# Patient Record
Sex: FEMALE | Race: WHITE
[De-identification: names, ages, dates, MRNs, and addresses within clinical notes are randomized per-mention and may not be internally consistent; named-entity substitution may affect disease eponyms.]

---

## 2019-01-08 ENCOUNTER — HOSPITAL ENCOUNTER (EMERGENCY)
Dept: HOSPITAL 31 - C.ER | Age: 24
Discharge: HOME | End: 2019-01-08
Payer: COMMERCIAL

## 2019-01-08 VITALS
RESPIRATION RATE: 16 BRPM | DIASTOLIC BLOOD PRESSURE: 74 MMHG | HEART RATE: 81 BPM | TEMPERATURE: 98.4 F | SYSTOLIC BLOOD PRESSURE: 119 MMHG

## 2019-01-08 VITALS — OXYGEN SATURATION: 98 %

## 2019-01-08 DIAGNOSIS — S63.611A: Primary | ICD-10-CM
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
## 2019-01-08 NOTE — RAD
Date of service: 



01/08/2019



PROCEDURE:  Left Index finger radiographs.



HISTORY:

pain and swelling after lifting a heavy carseat



COMPARISON:

None.



TECHNIQUE:

AP radiograph of the left hand, as well as spot oblique and lateral 

images of index finger were obtained.



FINDINGS:



LEFT INDEX FINGER:

Normal left index finger, without fracture or focal lesion. Remainder 

of the left hand (as seen on the AP view) grossly intact.



JOINTS:

Normal. 



SOFT TISSUES:

Normal. 



OTHER FINDINGS:

None.



IMPRESSION:

Normal left index finger radiographs.

## 2019-01-08 NOTE — C.PDOC
History Of Present Illness


23 year old female presents to the ED c/o left index finger pain that started 

few hours PTA. Patient states she lifted a baby car seat but does not recall 

injuring her finger. Patient did not feel pain immediately but few minutes later

she noticed swelling and more pain to the area. Patient denies fever, chills, 

rash, weakness, numbness, direct trauma, injury, fall. 


Time Seen by Provider: 01/08/19 01:23


Chief Complaint (Nursing): Upper Extremity Problem/Injury


History Per: Patient


History/Exam Limitations: no limitations


Onset/Duration Of Symptoms: Hrs


Current Symptoms Are (Timing): Still Present


Quality: "Pain"


Recent travel outside of the [place]: No


Additional History Per: Patient





Past Medical History


Reviewed: Historical Data, Nursing Documentation, Vital Signs


Vital Signs: 





                                Last Vital Signs











Temp  98.6 F   01/08/19 01:15


 


Pulse  100 H  01/08/19 01:15


 


Resp  20   01/08/19 01:15


 


BP  112/77   01/08/19 01:15


 


Pulse Ox  98   01/08/19 01:15














- Medical History


PMH: No Chronic Diseases


Surgical History: No Surg Hx


Family History: States: Unknown Family Hx





- Social History


Hx Alcohol Use: No


Hx Substance Use: No





Review Of Systems


Constitutional: Negative for: Fever, Chills


Cardiovascular: Negative for: Chest Pain


Respiratory: Negative for: Shortness of Breath


Gastrointestinal: Negative for: Nausea, Vomiting, Abdominal Pain


Musculoskeletal: Positive for: Hand Pain


Skin: Negative for: Rash


Neurological: Negative for: Weakness, Numbness





Physical Exam





- Physical Exam


Appears: Non-toxic, No Acute Distress


Skin: Normal Color, Warm, Dry


Head: Atraumatic, Normacephalic


Eye(s): bilateral: Normal Inspection


Neck: Normal ROM, Supple


Extremity: Normal ROM, Tenderness (base of left second finger PIP ), Capillary 

Refill (< 2 seconds), No Deformity, Swelling (base of left second finger PIP ), 

No Other (contractures, palpable nodules, erythema)


Pulses: Left Radial: Normal, Right Radial: Normal


Neurological/Psych: Oriented x3, Normal Speech, Normal Cognition, Normal Motor, 

Normal Sensation


Gait: Steady





ED Course And Treatment


O2 Sat by Pulse Oximetry: 98 (ON RA)


Pulse Ox Interpretation: Normal





- Other Rad


  ** Left hand X-Ray


X-Ray: Interpreted by Me, Viewed By Me


Interpretation: No fracture or dislocation


Progress Note: Plan:  - Left hand X-Ray.  Patient refused pain medications. 

Patient was placed on a finger splint and advised to follow up with hand Doctor 

and take NSAIDS for pain management.





Disposition


Counseled Patient/Family Regarding: Diagnosis, Need For Followup, Rx Given





- Disposition


Referrals: 


Henry Silvestre MD [Staff Provider] - 


Disposition: HOME/ ROUTINE


Disposition Time: 02:09


Condition: STABLE


Additional Instructions: 


Advil or motrin for pain





Apply ICE





Keep finger splint for support





Follow up with PMD/ Hand doctor for rwwvaluation





Return to ER if severe pain, moderate swelling, numbness , unable to move 

fingers, discoloration or worse 


Instructions:  Finger Sprain (DC)


Forms:  [facility] (English)





- Clinical Impression


Clinical Impression: 


 Other sprain of left index finger, initial encounter








- PA / NP / Resident Statement


MD/DO has reviewed & agrees with the documentation as recorded.





- Scribe Statement


The provider has reviewed the documentation as recorded by the Scribe


Ryan Victor





All medical record entries made by the Scribe were at my direction and 

personally dictated by me. I have reviewed the chart and agree that the record 

accurately reflects my personal performance of the history, physical exam, 

medical decision making, and the department course for this patient. I have also

personally directed, reviewed, and agree with the discharge instructions and 

disposition.